# Patient Record
Sex: FEMALE | URBAN - METROPOLITAN AREA
[De-identification: names, ages, dates, MRNs, and addresses within clinical notes are randomized per-mention and may not be internally consistent; named-entity substitution may affect disease eponyms.]

---

## 2023-05-08 ENCOUNTER — APPOINTMENT (RX ONLY)
Dept: URBAN - METROPOLITAN AREA CLINIC 43 | Facility: CLINIC | Age: 48
Setting detail: DERMATOLOGY
End: 2023-05-08

## 2023-05-08 DIAGNOSIS — L81.1 CHLOASMA: ICD-10-CM

## 2023-05-08 PROCEDURE — ? COUNSELING

## 2023-05-08 PROCEDURE — ? ADDITIONAL NOTES

## 2023-05-08 PROCEDURE — 99202 OFFICE O/P NEW SF 15 MIN: CPT

## 2023-05-08 ASSESSMENT — LOCATION SIMPLE DESCRIPTION DERM
LOCATION SIMPLE: INFERIOR FOREHEAD
LOCATION SIMPLE: RIGHT CHEEK
LOCATION SIMPLE: LEFT CHEEK

## 2023-05-08 ASSESSMENT — LOCATION ZONE DERM: LOCATION ZONE: FACE

## 2023-05-08 ASSESSMENT — LOCATION DETAILED DESCRIPTION DERM
LOCATION DETAILED: INFERIOR MID FOREHEAD
LOCATION DETAILED: RIGHT CENTRAL MALAR CHEEK
LOCATION DETAILED: LEFT CENTRAL MALAR CHEEK

## 2023-05-08 NOTE — PROCEDURE: ADDITIONAL NOTES
Additional Notes: The patient states that the hyperpigmentation started about three years ago. She had a laser treatment in [place] for the pigmentation that left her skin very sensitive. \\n\\nWe discussed that sensitive skin is not a specific diagnosis and there are no easy treatments such as injections (the patient states that these are available in [facility]). The patient has no evidence of atopic dermatitis or other rashes at today's visit.\\n\\nI suggested that she start treatment with topical azelaic acid and/or adapalene, but the patient declined due to her sensitive skin.\\n\\nI recommended that she wear sunscreen daily and I suggested products for sensitive skin.
Detail Level: Zone
Render Risk Assessment In Note?: no

## 2023-05-08 NOTE — HPI: DISCOLORATION
Additional History: The patient previously had multiple cosmetic procedures in [facility].